# Patient Record
Sex: FEMALE | Race: BLACK OR AFRICAN AMERICAN | NOT HISPANIC OR LATINO | Employment: FULL TIME | URBAN - METROPOLITAN AREA
[De-identification: names, ages, dates, MRNs, and addresses within clinical notes are randomized per-mention and may not be internally consistent; named-entity substitution may affect disease eponyms.]

---

## 2024-08-09 ENCOUNTER — TRANSCRIBE ORDERS (OUTPATIENT)
Dept: PHYSICAL THERAPY | Facility: CLINIC | Age: 39
End: 2024-08-09
Payer: OTHER MISCELLANEOUS

## 2024-08-09 DIAGNOSIS — M77.12 LATERAL EPICONDYLITIS OF LEFT ELBOW: Primary | ICD-10-CM

## 2024-08-15 ENCOUNTER — TREATMENT (OUTPATIENT)
Dept: PHYSICAL THERAPY | Facility: CLINIC | Age: 39
End: 2024-08-15
Payer: OTHER MISCELLANEOUS

## 2024-08-15 DIAGNOSIS — M77.12 LATERAL EPICONDYLITIS OF LEFT ELBOW: Primary | ICD-10-CM

## 2024-08-15 DIAGNOSIS — Z78.9 IMPAIRED ACTIVITIES OF DAILY LIVING: ICD-10-CM

## 2024-08-15 DIAGNOSIS — R29.898 DECREASED STRENGTH OF UPPER EXTREMITY: ICD-10-CM

## 2024-08-15 DIAGNOSIS — R29.898 DECREASED GRIP STRENGTH OF LEFT HAND: ICD-10-CM

## 2024-08-15 DIAGNOSIS — M25.622 DECREASED RANGE OF MOTION OF LEFT ELBOW: ICD-10-CM

## 2024-08-15 PROCEDURE — 97110 THERAPEUTIC EXERCISES: CPT

## 2024-08-15 PROCEDURE — 97161 PT EVAL LOW COMPLEX 20 MIN: CPT

## 2024-08-15 PROCEDURE — 97530 THERAPEUTIC ACTIVITIES: CPT

## 2024-08-15 NOTE — PROGRESS NOTES
Physical Therapy Initial Evaluation and Plan of Care                                               [address], [address]                                                           [place], [place]                                                         [phone]    Patient: Viola Mclaughlin   : 1985  Diagnosis/ICD-10 Code:  Lateral epicondylitis of left elbow [M77.12]  Referring practitioner: Quinten Pierson PA-C  Date of Initial Visit: 8/15/2024  Today's Date: 8/15/2024  Patient seen for 1 sessions           Subjective Questionnaire: QuickDASH: 47.73      Subjective Evaluation    History of Present Illness  Date of onset: 2024  Mechanism of injury: On 24, pt had onset of L elbow pain. She cannot recall one incident that caused injury but performed a lot of pushing, pulling, and max assist transfers prior to feeling the pain. She was sitting and resting when the pain came on. The pain is felt along her lateral left elbow. Pt describes the pain as pulling and burning with movements such as elbow flexion and extension, supination, or attempting to lift objects heavier than her phone. With stretching through end-ranges, the pain radiates up into her bicep and down through the medial aspect of her forearm.     Her pain is unchanged since onset and she reports no relief after completing the prednisone dose pack. She was given an epicondylitis brace which provides slight relief.     Pt is right handed and is able to perform most ADLs independently but cannot brush or wash her hair using her left hand. She denies sleep disturbance or prior injury to either UE.       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   Precautions and Work Restrictions: Off work currentlyQuality of life: good    Pain  Current pain ratin  At worst pain ratin  Location: Left lateral elbow  Quality: pulling, dull ache and burning  Relieving factors:  relaxation, rest and heat  Aggravating factors: lifting, outstretched reach, repetitive movement and overhead activity  Progression: no change    Hand dominance: right    Treatments  Current treatment: medication  Patient Goals  Patient goals for therapy: increased motion, decreased pain, increased strength, independence with ADLs/IADLs and return to work  Patient goal: return to prior level of function           Objective          Tenderness     Left Elbow   Tenderness in the lateral epicondyle.     Left Wrist/Hand   Tenderness in the lateral epicondyle.     Additional Tenderness Details  TTP at L common extensor tendon    Neurological Testing     Sensation     Elbow   Left Elbow   Intact: light touch    Right Elbow   Intact: light touch    Active Range of Motion     Left Elbow   Flexion: 90 degrees   Extension: Left elbow active extension: extensor lag= 8 degrees.   Forearm supination: 65 degrees   Forearm pronation: 90 degrees     Right Elbow   Flexion: 145 degrees   Extension: 0 degrees   Forearm supination: 90 degrees   Forearm pronation: 90 degrees     Left Wrist   Wrist flexion: 32 degrees   Wrist extension: 40 degrees with pain  Radial deviation: 10 degrees with pain  Ulnar deviation: 35 degrees     Right Wrist   Wrist flexion: 75 degrees   Wrist extension: 70 degrees   Radial deviation: 20 degrees   Ulnar deviation: 35 degrees     Strength/Myotome Testing     Left Elbow   Flexion: 4  Extension: 4  Forearm supination: 3  Forearm pronation: 3    Right Elbow   Flexion: 5  Extension: 5  Forearm supination: 5  Forearm pronation: 5    Left Wrist/Hand   Wrist extension: 4  Wrist flexion: 4  Radial deviation: 4  Ulnar deviation: 4     (2nd hand position)     Trial 1: 20 lbs    Trial 2: 10 lbs    Trial 3: 8 lbs    Average: 12.67 lbs    Right Wrist/Hand   Wrist extension: 5  Wrist flexion: 5  Radial deviation: 5  Ulnar deviation: 5     (2nd hand position)     Trial 1: 55 lbs    Trial 2: 45 lbs    Trial 3: 40  lbs    Average: 46.67 lbs          Assessment & Plan       Assessment  Impairments: abnormal or restricted ROM, activity intolerance, impaired physical strength, lacks appropriate home exercise program and pain with function   Functional limitations: carrying objects, lifting, pulling, pushing, uncomfortable because of pain, reaching overhead and unable to perform repetitive tasks   Assessment details: Viola is a 39 y/o RHD female reporting to OP PT for initial evaluation regarding left elbow pain which came on while at work on 8/6/24. She cannot recall a true injury but had been performing repetitive pulling, pushing, and lifting for about 30 minutes prior to onset of pain. She experiences a pulling or burning pain along the lateral aspect of her left elbow which comes on with movement such as elbow flexion, extension, supination, and wrist extension. She has great difficulty lifting objects heavier than a phone and is unable to wash or brush her hair using her left hand. Upon evaluation, she presents with TTP along her common extensor tendon and lateral epicondyle. Her L elbow and wrist range of motion is limited due to pain and MMT was not tolerated well on the L. Skilled OP PT is deemed reasonable and necessary in order to address these deficits, limitations, and impairments and assist with return to work.   Prognosis: good    Goals  Plan Goals: Short Term: 3 weeks  1. Pt will be independent with initial HEP  2. Pt will report 50% decrease in L elbow  3. Pt will demonstrate L elbow and wrist AROM symmetrical to her R UE for indication of improved mobility and ability to perform ADLS  4. Pt will demonstrate improvement in L  strength by 10 pounds for indication of improved ability to hold items    Long Term: 6 weeks  1. Pt will be independent with progressed HEP  2. Pt will report 0-2/10 pain in her L elbow  3. Pt will demonstrate 5/5 strength in her L elbow and wrist in all planes of motion for indication  of improved ability to perform ADLs  4. Pt will push/ pull/ lift 25 pounds for indication of improved ability to return to work    Plan  Therapy options: will be seen for skilled therapy services  Planned modality interventions: cryotherapy, electrical stimulation/Russian stimulation and thermotherapy (hydrocollator packs)  Planned therapy interventions: functional ROM exercises, home exercise program, therapeutic activities, stretching, strengthening and neuromuscular re-education  Frequency: 2x week  Duration in weeks: 6  Treatment plan discussed with: patient        Manual Therapy:    0     mins  60575;  Therapeutic Exercise:    10     mins  66303;     Neuromuscular Jamarcus:    0    mins  50395;    Therapeutic Activity:     10     mins  69851;     Gait Trainin     mins  48596;     Ultrasound:     0     mins  66369;    Electrical Stimulation:    0     mins  26636 ( );  Dry Needling     0     mins self-pay    Timed Treatment:   20   mins   Total Treatment:     37   mins    PT SIGNATURE: Paco Mccabe PT, DPT  [place] License #: [id number]   Paco Mccabe PT, 08/15/24, 12:54 PM EDT  DATE TREATMENT INITIATED: 8/15/2024    Initial Certification  Certification Period: 2024  I certify that the therapy services are furnished while this patient is under my care.  The services outlined above are required by this patient, and will be reviewed every 90 days.     PHYSICIAN: Quinten Pierson PA-C      DATE:     Please sign and return via fax to [phone].. Thank you, [facility].

## 2024-08-23 ENCOUNTER — TREATMENT (OUTPATIENT)
Dept: PHYSICAL THERAPY | Facility: CLINIC | Age: 39
End: 2024-08-23
Payer: OTHER MISCELLANEOUS

## 2024-08-23 DIAGNOSIS — R29.898 DECREASED STRENGTH OF UPPER EXTREMITY: ICD-10-CM

## 2024-08-23 DIAGNOSIS — M25.622 DECREASED RANGE OF MOTION OF LEFT ELBOW: ICD-10-CM

## 2024-08-23 DIAGNOSIS — R29.898 DECREASED GRIP STRENGTH OF LEFT HAND: ICD-10-CM

## 2024-08-23 DIAGNOSIS — M77.12 LATERAL EPICONDYLITIS OF LEFT ELBOW: Primary | ICD-10-CM

## 2024-08-23 DIAGNOSIS — Z78.9 IMPAIRED ACTIVITIES OF DAILY LIVING: ICD-10-CM

## 2024-08-23 NOTE — PROGRESS NOTES
Physical Therapy Daily Treatment Note               [address]                                                                                                                                             [place] [address]    Patient: Viola Mclaughlin   : 1985  Referring practitioner: No ref. provider found  Date of Initial Visit: Type: THERAPY  Noted: 8/15/2024  Today's Date: 2024  Patient seen for 2 sessions       Visit Diagnoses:    ICD-10-CM ICD-9-CM   1. Lateral epicondylitis of left elbow  M77.12 726.32   2. Decreased range of motion of left elbow  M25.622 719.52   3. Decreased  strength of left hand  R29.898 729.89   4. Impaired activities of daily living  Z78.9 V49.89   5. Decreased strength of upper extremity  R29.898 729.89       Subjective Evaluation    History of Present Illness    Subjective comment: Pt reports pain in (L) elbow remains unchanged.  Her elbow ROM is improving.Pain  Current pain ratin           Objective   See Exercise, Manual, and Modality Logs for complete treatment.   Added Finger flex/ext with TB web , stress ball squeeze, and wrist iso-flex    Assessment & Plan       Assessment  Assessment details: Pt completed treatment with mild c/o pain in (L) elbow.  She tolerated the addition of forearm, and wrist strengthening exercises with vc to not push into pain.  At end of treatment pt stated that her (L) elbow pain had slightly decreased.  Plan to progress per POC.          Timed:         Manual Therapy:    0     mins  21975;     Therapeutic Exercise:    16     mins  14135;     Neuromuscular Jamarcus:    0    mins  74112;    Therapeutic Activity:     10     mins  47995;     Gait Trainin     mins  40636;     Ultrasound:     0     mins  70779;    E Stim                            0    mins   06832( [id number])  Work Hardy/Cond      0    mins   15920        Timed Treatment:   26   mins   Total Treatment:     26   mins    Alfonso Jacobo PTA  [place]  License: [id number]

## 2024-08-27 ENCOUNTER — TREATMENT (OUTPATIENT)
Dept: PHYSICAL THERAPY | Facility: CLINIC | Age: 39
End: 2024-08-27
Payer: OTHER MISCELLANEOUS

## 2024-08-27 DIAGNOSIS — R29.898 DECREASED STRENGTH OF UPPER EXTREMITY: ICD-10-CM

## 2024-08-27 DIAGNOSIS — M77.12 LATERAL EPICONDYLITIS OF LEFT ELBOW: Primary | ICD-10-CM

## 2024-08-27 DIAGNOSIS — M25.622 DECREASED RANGE OF MOTION OF LEFT ELBOW: ICD-10-CM

## 2024-08-27 DIAGNOSIS — Z78.9 IMPAIRED ACTIVITIES OF DAILY LIVING: ICD-10-CM

## 2024-08-27 DIAGNOSIS — R29.898 DECREASED GRIP STRENGTH OF LEFT HAND: ICD-10-CM

## 2024-08-27 NOTE — PROGRESS NOTES
"   Physical Therapy Daily Progress Note                                            [address], [address]                                                           [place], [place]                                                         [phone]    Patient: Viola Mclaughlin   : 1985  Diagnosis/ICD-10 Code:  Lateral epicondylitis of left elbow [M77.12]  Referring practitioner: No ref. provider found  Date of Initial Visit: Type: THERAPY  Noted: 8/15/2024  Today's Date: 2024  Patient seen for 3 sessions           Subjective Evaluation    History of Present Illness    Subjective comment: Viola reports that her L elbow is much better. she still has minimal pain but it is tolerable       Objective   See Exercise, Manual, and Modality Logs for complete treatment.       Assessment & Plan       Assessment  Assessment details: Viola tolerated her session very well today. She completed all exercises reporting a \"good stretch\" but did not have any pain. Her L elbow and wrist motion was full today as well. Eccentric wrist extension with added weight was performed today to assist with decreasing inflammation at her L lateral epicondyle. Weighted hammer curls were also performed without increased pain. Viola has persisting L elbow strength deficits which were demonstrated during supination exercises. She had difficulty performing eccentric supination with a hammer with good control. She will benefit from progressive strengthening including work-like movements such as pushing, pulling, and lifting. We will progress L UE strength as tolerated.          Progress per Plan of Care           Manual Therapy:    0     mins  15571;  Therapeutic Exercise:    22     mins  37216;     Neuromuscular Jamarcus:    0    mins  28756;    Therapeutic Activity:     10     mins  01203;     Gait Trainin     mins  24862;     Ultrasound:     0     mins  64823;    Electrical Stimulation:    0     mins  29389 (MC " );  Dry Needling     0     mins self-pay    Timed Treatment:   32   mins   Total Treatment:     32   mins    Paco Mccabe PT, DPT  Physical Therapist  [place] License #: [id number]  Paco Mccabe PT, 08/27/24, 10:47 AM EDT

## 2024-10-11 ENCOUNTER — DOCUMENTATION (OUTPATIENT)
Dept: PHYSICAL THERAPY | Facility: CLINIC | Age: 39
End: 2024-10-11
Payer: OTHER MISCELLANEOUS